# Patient Record
Sex: FEMALE | Race: WHITE
[De-identification: names, ages, dates, MRNs, and addresses within clinical notes are randomized per-mention and may not be internally consistent; named-entity substitution may affect disease eponyms.]

---

## 2019-05-23 ENCOUNTER — TELEPHONE (OUTPATIENT)
Dept: SCHEDULING | Age: 29
End: 2019-05-23

## 2019-05-24 ENCOUNTER — E-ADVICE (OUTPATIENT)
Dept: ENDOCRINOLOGY | Age: 29
End: 2019-05-24

## 2019-05-24 ENCOUNTER — LAB SERVICES (OUTPATIENT)
Dept: LAB | Age: 29
End: 2019-05-24

## 2019-05-24 ENCOUNTER — OFFICE VISIT (OUTPATIENT)
Dept: ENDOCRINOLOGY | Age: 29
End: 2019-05-24

## 2019-05-24 DIAGNOSIS — R53.82 CHRONIC FATIGUE: Primary | ICD-10-CM

## 2019-05-24 DIAGNOSIS — R53.82 CHRONIC FATIGUE: ICD-10-CM

## 2019-05-24 LAB
CORTIS SERPL-MCNC: 6.8 MCG/DL (ref 3.4–22.5)
FSH SERPL-ACNC: 5.8 MUNITS/ML
HBA1C MFR BLD: 5.5 % (ref 4.5–5.6)
LH SERPL-ACNC: 4 MUNITS/ML
PROLACTIN SERPL-MCNC: 2.9 NG/ML (ref 2.8–29.2)
T4 FREE SERPL-MCNC: 1.2 NG/DL (ref 0.8–1.5)
TSH SERPL-ACNC: 0.74 MCUNITS/ML (ref 0.35–5)

## 2019-05-24 PROCEDURE — 83001 ASSAY OF GONADOTROPIN (FSH): CPT | Performed by: INTERNAL MEDICINE

## 2019-05-24 PROCEDURE — 99202 OFFICE O/P NEW SF 15 MIN: CPT | Performed by: INTERNAL MEDICINE

## 2019-05-24 PROCEDURE — 83002 ASSAY OF GONADOTROPIN (LH): CPT | Performed by: INTERNAL MEDICINE

## 2019-05-24 PROCEDURE — 84439 ASSAY OF FREE THYROXINE: CPT | Performed by: INTERNAL MEDICINE

## 2019-05-24 PROCEDURE — 36415 COLL VENOUS BLD VENIPUNCTURE: CPT | Performed by: INTERNAL MEDICINE

## 2019-05-24 PROCEDURE — 82024 ASSAY OF ACTH: CPT | Performed by: INTERNAL MEDICINE

## 2019-05-24 PROCEDURE — 83036 HEMOGLOBIN GLYCOSYLATED A1C: CPT | Performed by: INTERNAL MEDICINE

## 2019-05-24 PROCEDURE — 82607 VITAMIN B-12: CPT | Performed by: INTERNAL MEDICINE

## 2019-05-24 PROCEDURE — 84443 ASSAY THYROID STIM HORMONE: CPT | Performed by: INTERNAL MEDICINE

## 2019-05-24 PROCEDURE — 84146 ASSAY OF PROLACTIN: CPT | Performed by: INTERNAL MEDICINE

## 2019-05-24 PROCEDURE — 82533 TOTAL CORTISOL: CPT | Performed by: INTERNAL MEDICINE

## 2019-05-24 RX ORDER — CELECOXIB 200 MG/1
200 CAPSULE ORAL 2 TIMES DAILY
COMMUNITY

## 2019-05-24 RX ORDER — ALPRAZOLAM 0.5 MG/1
0.25 TABLET ORAL DAILY PRN
COMMUNITY

## 2019-05-24 ASSESSMENT — PATIENT HEALTH QUESTIONNAIRE - PHQ9
1. LITTLE INTEREST OR PLEASURE IN DOING THINGS: NOT AT ALL
SUM OF ALL RESPONSES TO PHQ9 QUESTIONS 1 AND 2: 0
2. FEELING DOWN, DEPRESSED OR HOPELESS: NOT AT ALL
SUM OF ALL RESPONSES TO PHQ9 QUESTIONS 1 AND 2: 0

## 2019-05-24 ASSESSMENT — PAIN SCALES - GENERAL: PAINLEVEL: 1-2

## 2019-05-25 ENCOUNTER — E-ADVICE (OUTPATIENT)
Dept: ENDOCRINOLOGY | Age: 29
End: 2019-05-25

## 2019-05-25 DIAGNOSIS — R79.89 LOW SERUM CORTISOL LEVEL: Primary | ICD-10-CM

## 2019-05-26 ASSESSMENT — ENCOUNTER SYMPTOMS
DIZZINESS: 0
UNEXPECTED WEIGHT CHANGE: 0
FATIGUE: 1
HEMATOLOGIC/LYMPHATIC NEGATIVE: 1
HEADACHES: 1
PSYCHIATRIC NEGATIVE: 1
ALLERGIC/IMMUNOLOGIC NEGATIVE: 1
RESPIRATORY NEGATIVE: 1
ENDOCRINE NEGATIVE: 1
GASTROINTESTINAL NEGATIVE: 1
EYES NEGATIVE: 1

## 2019-05-28 ENCOUNTER — TELEPHONE (OUTPATIENT)
Dept: SCHEDULING | Age: 29
End: 2019-05-28

## 2019-05-28 ENCOUNTER — TELEPHONE (OUTPATIENT)
Dept: INTERNAL MEDICINE | Age: 29
End: 2019-05-28

## 2019-05-28 LAB — ACTH PLAS-MCNC: 5.2 PG/ML (ref 0–46)

## 2019-05-29 ENCOUNTER — E-ADVICE (OUTPATIENT)
Dept: ENDOCRINOLOGY | Age: 29
End: 2019-05-29

## 2019-05-29 LAB — VIT B12 SERPL-MCNC: 910 PG/ML (ref 211–911)

## 2019-05-31 ENCOUNTER — TELEPHONE (OUTPATIENT)
Dept: INTERNAL MEDICINE | Age: 29
End: 2019-05-31

## 2019-06-01 ENCOUNTER — TELEPHONE (OUTPATIENT)
Dept: INTERNAL MEDICINE | Age: 29
End: 2019-06-01

## 2019-06-04 ENCOUNTER — OFFICE VISIT (OUTPATIENT)
Dept: INTERNAL MEDICINE | Age: 29
End: 2019-06-04

## 2019-06-04 ENCOUNTER — LAB SERVICES (OUTPATIENT)
Dept: LAB | Age: 29
End: 2019-06-04

## 2019-06-04 DIAGNOSIS — R79.89 LOW SERUM CORTISOL LEVEL: ICD-10-CM

## 2019-06-04 LAB
CORTIS SERPL-MCNC: 24.6 MCG/DL (ref 3.4–22.5)
CORTIS SERPL-MCNC: 46.5 MCG/DL (ref 3.4–22.5)

## 2019-06-04 PROCEDURE — 36415 COLL VENOUS BLD VENIPUNCTURE: CPT | Performed by: INTERNAL MEDICINE

## 2019-06-04 PROCEDURE — 80400 ACTH STIMULATION PANEL: CPT | Performed by: INTERNAL MEDICINE

## 2019-06-04 PROCEDURE — 96372 THER/PROPH/DIAG INJ SC/IM: CPT | Performed by: INTERNAL MEDICINE

## 2019-06-05 ENCOUNTER — E-ADVICE (OUTPATIENT)
Dept: ENDOCRINOLOGY | Age: 29
End: 2019-06-05

## 2019-06-10 ENCOUNTER — TELEPHONE (OUTPATIENT)
Dept: SCHEDULING | Age: 29
End: 2019-06-10

## 2019-06-19 ENCOUNTER — E-ADVICE (OUTPATIENT)
Dept: ENDOCRINOLOGY | Age: 29
End: 2019-06-19

## 2019-06-28 DIAGNOSIS — R53.82 CHRONIC FATIGUE: ICD-10-CM

## 2021-05-25 VITALS
SYSTOLIC BLOOD PRESSURE: 118 MMHG | RESPIRATION RATE: 16 BRPM | TEMPERATURE: 98.4 F | HEIGHT: 67 IN | HEART RATE: 72 BPM | DIASTOLIC BLOOD PRESSURE: 64 MMHG | OXYGEN SATURATION: 100 % | BODY MASS INDEX: 20.25 KG/M2 | WEIGHT: 129 LBS

## 2021-07-25 PROBLEM — R51.9 CHRONIC DAILY HEADACHE: Status: ACTIVE | Noted: 2021-07-25

## 2021-07-25 PROBLEM — M79.7 FIBROMYALGIA: Status: ACTIVE | Noted: 2021-07-25

## 2021-07-25 PROBLEM — R00.0 TACHYCARDIA: Status: ACTIVE | Noted: 2021-07-25

## 2021-07-25 PROBLEM — R79.89 ABNORMAL TSH: Status: ACTIVE | Noted: 2021-07-25

## 2021-07-25 PROBLEM — Z28.21 COVID-19 VACCINE SERIES DECLINED: Status: ACTIVE | Noted: 2021-07-25

## 2021-07-25 PROBLEM — Z28.310 COVID-19 VACCINE SERIES DECLINED: Status: ACTIVE | Noted: 2021-07-25

## 2021-07-25 PROBLEM — L70.8 OTHER ACNE: Status: ACTIVE | Noted: 2021-07-25

## 2021-07-25 PROBLEM — F41.1 GAD (GENERALIZED ANXIETY DISORDER): Status: ACTIVE | Noted: 2021-07-25

## 2021-07-25 PROBLEM — Z86.69 HISTORY OF EPILEPSY: Status: ACTIVE | Noted: 2021-07-25

## 2023-06-19 LAB
AMB EXT TREPONEMAL ANTIBODIES: NEGATIVE
ANTIBODY SCREEN OB: NEGATIVE
HEPATITIS B SURFACE ANTIGEN OB: NEGATIVE
HIV RESULT OB: NEGATIVE
RH FACTOR OB: POSITIVE

## 2023-07-17 ENCOUNTER — TELEPHONE (OUTPATIENT)
Dept: PERINATAL CARE | Facility: HOSPITAL | Age: 33
End: 2023-07-17

## 2023-07-17 NOTE — TELEPHONE ENCOUNTER
Returned pt call RE scheduling. No answer  Left Voice mail to call back with [facility] number  [phone]      [facility] fax # [phone] to have order faxed in prior to scheduling with [facility].

## 2023-10-16 ENCOUNTER — OFFICE VISIT (OUTPATIENT)
Dept: PERINATAL CARE | Facility: HOSPITAL | Age: 33
End: 2023-10-16
Attending: PEDIATRICS
Payer: COMMERCIAL

## 2023-10-16 DIAGNOSIS — R00.0 TACHYCARDIA: Primary | ICD-10-CM

## 2023-10-16 DIAGNOSIS — R00.0 TACHYCARDIA: ICD-10-CM

## 2023-10-16 PROCEDURE — 76825 ECHO EXAM OF FETAL HEART: CPT | Performed by: PEDIATRICS

## 2023-10-16 PROCEDURE — 93325 DOPPLER ECHO COLOR FLOW MAPG: CPT

## 2023-10-16 PROCEDURE — 76827 ECHO EXAM OF FETAL HEART: CPT

## 2023-10-16 NOTE — PROGRESS NOTES
CARDIOLOGY CONSULTATION AND FETAL ECHOCARDIOGRAM :    Dear Dr. Melissa Garcia,     Thank you for requesting fetal echocardiogram and  cardiology consultation on [facility]. As you are aware she is a 34year old female with a Talavera pregnancy at 25w2d.  cardiology consultation was requested secondary to  suspected VSD and medication exposure. Her prenatal records and labs were reviewed. OB History:         T2    L2    SAB1  IAB0  Ectopic0  Multiple0  Live Births2       Comment: PP hemorrhage     Name of Baby 1: Not recorded    Date: Not recorded     GA: Not recorded     Delivery: Not recorded    Ardena Arrow: Not recorded     Analia Eduardo: Not recorded    Living: Not recorded     Name of Baby 2: Not recorded    Date: 09/24/15         GA: 39w0d            Delivery: Caesarean Section    Apgar1: Not recorded     Apgar5: Not recorded    Living: Living     Name of Baby 3: Not recorded    Date: 18         GA: 39w0d            Delivery: Normal spontaneous vaginal delivery    Apgar1: Not recorded     Apgar5: Not recorded    Living: Living     Name of Baby 4: Not recorded    Date: Not recorded     GA: Not recorded     Delivery: Not recorded    Apgar1: Not recorded     Analia Wardiel: Not recorded    Living: Not recorded        Allergies:  No Known Allergies  Current Meds:        Current Outpatient Medications  Medication Sig Dispense Refill   Prenatal 27-0.8 MG Oral Tab Take 1 tablet by mouth daily. amitriptyline 10 MG Oral Tab Take 2 tablets (20 mg total) by mouth nightly. (Patient not taking: Reported on 2023)   0   Lactic Ac-Citric Ac-Pot Bitart (PHEXXI) 1.8-1-0.4 % Vaginal Gel Place 5 g vaginally as needed. (Patient not taking: Reported on 2023) 12 each 3   spironolactone 100 MG Oral Tab Take 100 mg by mouth nightly. (Patient not taking: Reported on 2023)       Magnesium 200 MG Oral Tab Take by mouth.  (Patient not taking: Reported on 2023)       clonazePAM 0.5 MG Oral Tab Take 0.25 mg by mouth. (Patient not taking: Reported on 2023)       Multiple Vitamin (MULTIVITAMINS) Oral Tab Take 1 Tab by mouth daily. (Patient not taking: Reported on 2023)           HISTORY:      Past Medical History:  Diagnosis Date   Abnormal TSH 2021   Chronic daily headache 2021   COVID-19 vaccine series declined 2021   Fibromyalgia     LILIAM (generalized anxiety disorder) 2021   History of epilepsy 2021   Other acne 2021   SEIZURE DISORDER     Tachycardia 2021         Past Surgical History:  Procedure Laterality Date    DELIVERY ONLY             Family History  Problem Relation Age of Onset   Diabetes Maternal Grandmother     Hypertension Father     Lipids Father     Hypertension Paternal Grandmother     Lipids Mother     Diabetes Other  No family history of congenital heart disease. Social History    Tobacco Use      Smoking status: Never      Smokeless tobacco: Never    Vaping Use      Vaping Use: Never used    Alcohol use: Not Currently    Drug use: Never    FETAL ECHOCARDIOGRAM :     Situs: situs solitus (normal). Cardiac position: levocardia (normal). Cardiac axis: normal. Cardiac size: normal (approx. 1/3 of thoracic area). Cardiac Rhythm: regular (normal). Cardiac function: good contractility (normal). 4-chamber view: normal. LVOT view: normal. RVOT view: normal. 3-vessel view: normal. 3-vessel-trachea view: normal. Long axis view: normal. Aortic arch view: normal. Ductal arch view: normal. Bicaval view: normal    2D Echo (Qualitatively):   AV connections: Normal connections. VA connections: Normal connections. IVC: Normal entrance into the right atrium. SVC: Normal entrance into the right atrium. Pulmonary veins: Two pulmonary veins entry into the left atrium. Right atrium: Normal in size. Left atrium: Normal in size. Atrial septum: foramen ovale in the central third/half, flap valve in LA. Foramen ovale: normal flow: right to left. Right ventricle: Normal sized RV with normal systolic functions. Left ventricle: Normal sized LV with normal systolic functions. Ventricular septum: Small apical muscular VSD with bidirectional shunting. Tricuspid valve: normal size and morphology. Mitral valve: normal size and morphology. Pulmonary valve: normal size . Aortic valve: normal size. Cross-over gr. arteries: anterior great artery (confirmed to be the pulmonary artery by its branching) which crosses the course of the proximal aorta, indicative of normal relationship of the great arteries. Main PA: the main pulmonary artery can be seen bifurcating into the ductus arteriosus and the right pulmonary artery. Pulmonary arteries: Visualized. Ascending aorta: normal size and morphology. Ductal arch: Left ductus arteriosus. Aortic arch: Single left sided aortic arch with no evidence for coarctation. Ductus venosus: normal. Umbilical vein: normal. Umbilical arteries: normal  Echogenic focus: no. Linear insertion of AV valves: no. Pericardial effusion: no    Color Doppler (Qualitatively):   IVC inflow into RA: normal. SVC inflow into RA: normal. Pulm. veins inflow into LA: normal. Flow through foramen ovale: right-left shunt (normal). Tricuspid valve flow: normal. Mitral valve flow: normal. Ventricular septum: normal. RVOT / Pulmonary valve flow: normal. LVOT / Aortic valve flow: normal. Flow in pulmonary arteries: normal. Flow in ductus arteriosus: normal. Flow in aortic arch: normal. Flow in ductus venosus: normal. Flow in the umbilical vein: normal. Flow in the umbilical arteries: normal    Small apical muscular VSD with bidirectional shunting. Otherwise unremarkable fetal echocardiogram. No major structural or functional abnormality was noted. DISCUSSION :    Exposure to benzodiazepine : It is category D. Therapeutic doses in pregnancy have not been shown to significantly increase the risk of teratogenecity.  There is lack of sufficient data to indicate that there is no risk. Transient  withdrawal symptoms are seen in pregnant mothers that exposed to the medication during pregnancy. Its use during pregnancy may be considered if the potentail benefits seem to outweight potential risks. Fetal echocardiogram findings are described above. There was a small apical muscular VSD with bidirectional shunting. Otherwise unremarkable fetal echocardiogram. No major structural or functional abnormality was noted. I had a lengthy discussion regarding my findings with the parents. I drew a picture of the fetal heart and went over my findings with them. Isolated muscular VSDs carry a very favorable outcome with high likely chance of spontaneous closures. If there are multiple muscular VSDs \" so called Swiss cheese muscular VSDs\" they may push the infant for CHF and may require surgery. At this time I see one small isolated muscular VSD. They had many questions. I answered their questions. They seemed to have a good understanding the issues we talked. Small VSDs,minor valve problems,coronary artery anomalies,PAPVR and coarctation of the aorta cannot be excluded by fetal echocardiograms. IMPRESSION :    IUP at 25w 2d  No fetal structural abnormalities seen other than possible VSD  Medication exposure (benzodiazepine)  H/o postpartum hemorrhage  Fetal echocardiogram : Small apical muscular VSD with bidirectional shunting. Otherwise unremarkable fetal echocardiogram. No major structural or functional abnormality was noted. RECOMMENDATIONS :    Routine Ob and MFM care  [facility] at [facility]   echocardiogram prior to discharge      Thank you for allowing me to participate in the care of your patient. Please do not hesitate to call with any questions or concerns.      Francisco Cortes MD  Fetal/pediatric Cardiology    Total time spent  60 minutes this calendar day which includes preparing to see the patient including chart review, obtaining and/or reviewing additional medical history,  documenting clinical information in the electronic medical record, independently interpreting results, counseling the patient, communicating results to the patient/family/caregiver and coordinating care. Note to patient and family:  The [id number] makes medical notes available to patients in the interest of transparency. However, please be advised that this is a medical document. It is intended as a peer to peer communication. It is written in medical language and may contain abbreviations or verbiage that are technical and unfamiliar. It may appear blunt or direct. Medical documents are intended to carry relevant information, facts as evident, and the clinical opinion of the practitioner.

## 2023-11-03 LAB
AMB EXT STREP B CULTURE: NEGATIVE
AMB EXT TREPONEMAL ANTIBODIES: NEGATIVE
HIV RESULT OB: NEGATIVE

## 2023-12-07 ENCOUNTER — HOSPITAL ENCOUNTER (OUTPATIENT)
Dept: PERINATAL CARE | Facility: HOSPITAL | Age: 33
Discharge: HOME | End: 2023-12-07
Attending: OBSTETRICS & GYNECOLOGY
Payer: COMMERCIAL

## 2023-12-07 VITALS
DIASTOLIC BLOOD PRESSURE: 79 MMHG | SYSTOLIC BLOOD PRESSURE: 133 MMHG | HEART RATE: 90 BPM | WEIGHT: 168 LBS | BODY MASS INDEX: 26 KG/M2

## 2023-12-07 DIAGNOSIS — R56.9 SEIZURES (HCC): ICD-10-CM

## 2023-12-07 DIAGNOSIS — Z36.83 ENCOUNTER FOR FETAL SCREENING FOR CONGENITAL CARDIAC ABNORMALITIES: ICD-10-CM

## 2023-12-07 DIAGNOSIS — F41.1 GAD (GENERALIZED ANXIETY DISORDER): ICD-10-CM

## 2023-12-07 DIAGNOSIS — Z36.83 ENCOUNTER FOR FETAL SCREENING FOR CONGENITAL CARDIAC ABNORMALITIES: Primary | ICD-10-CM

## 2023-12-07 PROCEDURE — 76819 FETAL BIOPHYS PROFIL W/O NST: CPT

## 2023-12-07 PROCEDURE — 76816 OB US FOLLOW-UP PER FETUS: CPT | Performed by: OBSTETRICS & GYNECOLOGY

## 2024-01-21 ENCOUNTER — ANESTHESIA EVENT (OUTPATIENT)
Dept: OBGYN UNIT | Facility: HOSPITAL | Age: 34
End: 2024-01-21
Payer: COMMERCIAL

## 2024-01-21 ENCOUNTER — HOSPITAL ENCOUNTER (INPATIENT)
Facility: HOSPITAL | Age: 34
LOS: 1 days | Discharge: HOME | End: 2024-01-22
Attending: OBSTETRICS & GYNECOLOGY | Admitting: OBSTETRICS & GYNECOLOGY
Payer: COMMERCIAL

## 2024-01-21 ENCOUNTER — ANESTHESIA (OUTPATIENT)
Dept: OBGYN UNIT | Facility: HOSPITAL | Age: 34
End: 2024-01-21
Payer: COMMERCIAL

## 2024-01-21 PROBLEM — Z34.90 PREGNANCY (HCC): Status: ACTIVE | Noted: 2024-01-21

## 2024-01-21 PROBLEM — O34.219 VAGINAL BIRTH AFTER CESAREAN: Status: ACTIVE | Noted: 2024-01-21

## 2024-01-21 PROBLEM — O34.219 VAGINAL BIRTH AFTER CESAREAN (HCC): Status: ACTIVE | Noted: 2024-01-21

## 2024-01-21 PROBLEM — Z34.90 PREGNANCY: Status: ACTIVE | Noted: 2024-01-21

## 2024-01-21 LAB
ANTIBODY SCREEN: NEGATIVE
BASOPHILS # BLD AUTO: 0.02 X10(3) UL (ref 0–0.2)
BASOPHILS NFR BLD AUTO: 0.2 %
DEPRECATED RDW RBC AUTO: 44.5 FL (ref 35.1–46.3)
EOSINOPHIL # BLD AUTO: 0.04 X10(3) UL (ref 0–0.7)
EOSINOPHIL NFR BLD AUTO: 0.4 %
ERYTHROCYTE [DISTWIDTH] IN BLOOD BY AUTOMATED COUNT: 13.6 % (ref 11–15)
HCT VFR BLD AUTO: 39.3 %
HGB BLD-MCNC: 13.1 G/DL
IMM GRANULOCYTES # BLD AUTO: 0.08 X10(3) UL (ref 0–1)
IMM GRANULOCYTES NFR BLD: 0.7 %
LYMPHOCYTES # BLD AUTO: 2.08 X10(3) UL (ref 1–4)
LYMPHOCYTES NFR BLD AUTO: 18.7 %
MCH RBC QN AUTO: 29.7 PG (ref 26–34)
MCHC RBC AUTO-ENTMCNC: 33.3 G/DL (ref 31–37)
MCV RBC AUTO: 89.1 FL
MONOCYTES # BLD AUTO: 0.57 X10(3) UL (ref 0.1–1)
MONOCYTES NFR BLD AUTO: 5.1 %
NEUTROPHILS # BLD AUTO: 8.31 X10 (3) UL (ref 1.5–7.7)
NEUTROPHILS # BLD AUTO: 8.31 X10(3) UL (ref 1.5–7.7)
NEUTROPHILS NFR BLD AUTO: 74.9 %
PLATELET # BLD AUTO: 224 10(3)UL (ref 150–450)
RBC # BLD AUTO: 4.41 X10(6)UL
RH BLOOD TYPE: POSITIVE
RH BLOOD TYPE: POSITIVE
WBC # BLD AUTO: 11.1 X10(3) UL (ref 4–11)

## 2024-01-21 PROCEDURE — 99214 OFFICE O/P EST MOD 30 MIN: CPT

## 2024-01-21 PROCEDURE — 86900 BLOOD TYPING SEROLOGIC ABO: CPT | Performed by: OBSTETRICS & GYNECOLOGY

## 2024-01-21 PROCEDURE — 85025 COMPLETE CBC W/AUTO DIFF WBC: CPT | Performed by: OBSTETRICS & GYNECOLOGY

## 2024-01-21 PROCEDURE — 86901 BLOOD TYPING SEROLOGIC RH(D): CPT | Performed by: OBSTETRICS & GYNECOLOGY

## 2024-01-21 PROCEDURE — 86850 RBC ANTIBODY SCREEN: CPT | Performed by: OBSTETRICS & GYNECOLOGY

## 2024-01-21 RX ORDER — BISACODYL 10 MG
10 SUPPOSITORY, RECTAL RECTAL ONCE AS NEEDED
Status: DISCONTINUED | OUTPATIENT
Start: 2024-01-21 | End: 2024-01-22

## 2024-01-21 RX ORDER — ONDANSETRON 2 MG/ML
4 INJECTION INTRAMUSCULAR; INTRAVENOUS EVERY 6 HOURS PRN
Status: DISCONTINUED | OUTPATIENT
Start: 2024-01-21 | End: 2024-01-22

## 2024-01-21 RX ORDER — CHOLECALCIFEROL (VITAMIN D3) 25 MCG
1 TABLET,CHEWABLE ORAL DAILY
Status: DISCONTINUED | OUTPATIENT
Start: 2024-01-21 | End: 2024-01-22

## 2024-01-21 RX ORDER — ACETAMINOPHEN 500 MG
1000 TABLET ORAL EVERY 6 HOURS PRN
Status: DISCONTINUED | OUTPATIENT
Start: 2024-01-21 | End: 2024-01-22

## 2024-01-21 RX ORDER — BENZOCAINE/MENTHOL 6 MG-10 MG
1 LOZENGE MUCOUS MEMBRANE EVERY 6 HOURS PRN
Status: DISCONTINUED | OUTPATIENT
Start: 2024-01-21 | End: 2024-01-22

## 2024-01-21 RX ORDER — MISOPROSTOL 200 UG/1
TABLET ORAL
Status: DISCONTINUED
Start: 2024-01-21 | End: 2024-01-21 | Stop reason: WASHOUT

## 2024-01-21 RX ORDER — SODIUM CHLORIDE, SODIUM LACTATE, POTASSIUM CHLORIDE, CALCIUM CHLORIDE 600; 310; 30; 20 MG/100ML; MG/100ML; MG/100ML; MG/100ML
INJECTION, SOLUTION INTRAVENOUS AS NEEDED
Status: DISCONTINUED | OUTPATIENT
Start: 2024-01-21 | End: 2024-01-21 | Stop reason: HOSPADM

## 2024-01-21 RX ORDER — CHOLECALCIFEROL (VITAMIN D3) 25 MCG
1 TABLET,CHEWABLE ORAL DAILY
COMMUNITY

## 2024-01-21 RX ORDER — SIMETHICONE 80 MG
80 TABLET,CHEWABLE ORAL 3 TIMES DAILY PRN
Status: DISCONTINUED | OUTPATIENT
Start: 2024-01-21 | End: 2024-01-22

## 2024-01-21 RX ORDER — DEXTROSE, SODIUM CHLORIDE, SODIUM LACTATE, POTASSIUM CHLORIDE, AND CALCIUM CHLORIDE 5; .6; .31; .03; .02 G/100ML; G/100ML; G/100ML; G/100ML; G/100ML
INJECTION, SOLUTION INTRAVENOUS CONTINUOUS
Status: DISCONTINUED | OUTPATIENT
Start: 2024-01-21 | End: 2024-01-21 | Stop reason: HOSPADM

## 2024-01-21 RX ORDER — ONDANSETRON 2 MG/ML
4 INJECTION INTRAMUSCULAR; INTRAVENOUS EVERY 6 HOURS PRN
Status: DISCONTINUED | OUTPATIENT
Start: 2024-01-21 | End: 2024-01-21 | Stop reason: HOSPADM

## 2024-01-21 RX ORDER — IBUPROFEN 600 MG/1
600 TABLET ORAL EVERY 6 HOURS
Status: DISCONTINUED | OUTPATIENT
Start: 2024-01-21 | End: 2024-01-22

## 2024-01-21 RX ORDER — BUPIVACAINE HCL/0.9 % NACL/PF 0.25 %
5 PLASTIC BAG, INJECTION (ML) EPIDURAL AS NEEDED
Status: DISCONTINUED | OUTPATIENT
Start: 2024-01-21 | End: 2024-01-21

## 2024-01-21 RX ORDER — DOCUSATE SODIUM 100 MG/1
100 CAPSULE, LIQUID FILLED ORAL 2 TIMES DAILY
Status: DISCONTINUED | OUTPATIENT
Start: 2024-01-21 | End: 2024-01-22

## 2024-01-21 RX ORDER — ACETAMINOPHEN 500 MG
500 TABLET ORAL EVERY 6 HOURS PRN
Status: DISCONTINUED | OUTPATIENT
Start: 2024-01-21 | End: 2024-01-21 | Stop reason: HOSPADM

## 2024-01-21 RX ORDER — ACETAMINOPHEN 500 MG
1000 TABLET ORAL EVERY 6 HOURS PRN
Status: DISCONTINUED | OUTPATIENT
Start: 2024-01-21 | End: 2024-01-21 | Stop reason: HOSPADM

## 2024-01-21 RX ORDER — LIDOCAINE HYDROCHLORIDE 10 MG/ML
INJECTION, SOLUTION INFILTRATION; PERINEURAL
Status: COMPLETED | OUTPATIENT
Start: 2024-01-21 | End: 2024-01-21

## 2024-01-21 RX ORDER — CITRIC ACID/SODIUM CITRATE 334-500MG
30 SOLUTION, ORAL ORAL AS NEEDED
Status: DISCONTINUED | OUTPATIENT
Start: 2024-01-21 | End: 2024-01-21 | Stop reason: HOSPADM

## 2024-01-21 RX ORDER — TRANEXAMIC ACID 10 MG/ML
INJECTION, SOLUTION INTRAVENOUS
Status: COMPLETED
Start: 2024-01-21 | End: 2024-01-21

## 2024-01-21 RX ORDER — NALBUPHINE HYDROCHLORIDE 10 MG/ML
2.5 INJECTION, SOLUTION INTRAMUSCULAR; INTRAVENOUS; SUBCUTANEOUS
Status: DISCONTINUED | OUTPATIENT
Start: 2024-01-21 | End: 2024-01-21

## 2024-01-21 RX ORDER — ACETAMINOPHEN 500 MG
500 TABLET ORAL EVERY 6 HOURS PRN
Status: DISCONTINUED | OUTPATIENT
Start: 2024-01-21 | End: 2024-01-22

## 2024-01-21 RX ORDER — LIDOCAINE HYDROCHLORIDE 10 MG/ML
30 INJECTION, SOLUTION EPIDURAL; INFILTRATION; INTRACAUDAL; PERINEURAL ONCE
Status: DISCONTINUED | OUTPATIENT
Start: 2024-01-21 | End: 2024-01-21 | Stop reason: HOSPADM

## 2024-01-21 RX ORDER — AMMONIA INHALANTS 0.04 G/.3ML
0.3 INHALANT RESPIRATORY (INHALATION) AS NEEDED
Status: DISCONTINUED | OUTPATIENT
Start: 2024-01-21 | End: 2024-01-22

## 2024-01-21 RX ORDER — IBUPROFEN 600 MG/1
600 TABLET ORAL ONCE AS NEEDED
Status: DISCONTINUED | OUTPATIENT
Start: 2024-01-21 | End: 2024-01-21 | Stop reason: HOSPADM

## 2024-01-21 RX ORDER — LIDOCAINE HYDROCHLORIDE AND EPINEPHRINE 15; 5 MG/ML; UG/ML
INJECTION, SOLUTION EPIDURAL
Status: COMPLETED | OUTPATIENT
Start: 2024-01-21 | End: 2024-01-21

## 2024-01-21 RX ORDER — CARBOPROST TROMETHAMINE 250 UG/ML
INJECTION, SOLUTION INTRAMUSCULAR
Status: DISCONTINUED
Start: 2024-01-21 | End: 2024-01-21 | Stop reason: WASHOUT

## 2024-01-21 RX ORDER — METHYLERGONOVINE MALEATE 0.2 MG/ML
INJECTION INTRAVENOUS
Status: DISCONTINUED
Start: 2024-01-21 | End: 2024-01-21 | Stop reason: WASHOUT

## 2024-01-21 RX ORDER — BUPIVACAINE HYDROCHLORIDE 2.5 MG/ML
20 INJECTION, SOLUTION EPIDURAL; INFILTRATION; INTRACAUDAL ONCE
Status: DISCONTINUED | OUTPATIENT
Start: 2024-01-21 | End: 2024-01-21 | Stop reason: HOSPADM

## 2024-01-21 RX ORDER — TERBUTALINE SULFATE 1 MG/ML
0.25 INJECTION, SOLUTION SUBCUTANEOUS AS NEEDED
Status: DISCONTINUED | OUTPATIENT
Start: 2024-01-21 | End: 2024-01-21 | Stop reason: HOSPADM

## 2024-01-21 RX ORDER — MELATONIN
325
COMMUNITY
End: 2024-01-22

## 2024-01-21 RX ADMIN — LIDOCAINE HYDROCHLORIDE 5 ML: 10 INJECTION, SOLUTION INFILTRATION; PERINEURAL at 11:20:00

## 2024-01-21 RX ADMIN — LIDOCAINE HYDROCHLORIDE AND EPINEPHRINE 2 ML: 15; 5 INJECTION, SOLUTION EPIDURAL at 11:33:00

## 2024-01-21 RX ADMIN — LIDOCAINE HYDROCHLORIDE AND EPINEPHRINE 3 ML: 15; 5 INJECTION, SOLUTION EPIDURAL at 11:31:00

## 2024-01-21 NOTE — ANESTHESIA PROCEDURE NOTES
Labor Analgesia    Date/Time: 1/21/2024 11:20 AM    Performed by: Colin Durant MD  Authorized by: Colin Durant MD      General Information and Staff    Start Time:  1/21/2024 11:20 AM  End Time:  1/21/2024 11:31 AM  Anesthesiologist:  Colin Durant MD  Performed by:  Anesthesiologist  Patient Location:  OB  Reason for Block: labor epidural    Preanesthetic Checklist: patient identified, IV checked, site marked, risks and benefits discussed, monitors and equipment checked, pre-op evaluation, timeout performed, anesthesia consent and sterile technique used      Procedure Details    Patient Position:  Sitting  Prep: ChloraPrep    Monitoring:  Heart rate  Approach:  Midline    Epidural Needle    Injection Technique:  MELIA air  Needle Type:  Tuohy  Needle Gauge:  18 G  Needle Length:  3.5 in  Needle Insertion Depth:  5  Location:  L3-4    Spinal Needle    Needle Type:  Pencil-tip  Needle Gauge:  27 G    Catheter    Catheter Type:  Multi-orifice  Catheter Size:  20 G  Catheter at Skin Depth:  10  Test Dose:  Negative    Assessment      Additional Comments     Patient with significant scoliosis. MELIA attained at 5cm at L3-4 level, confirmed with dural puncture with 27g pencil tip returning clear CSF. Epidural catheter threaded without difficulty and secured 10cm at skin.

## 2024-01-21 NOTE — ANESTHESIA PREPROCEDURE EVALUATION
Anesthesia PreOp Note    HPI:     Rachel Hernandez is a 33 year old female who presents for preoperative consultation requested by: * No surgeons listed *    Date of Surgery: 2024    * No procedures listed *  Indication: * No pre-op diagnosis entered *    Relevant Problems   No relevant active problems       NPO:                         History Review:  Patient Active Problem List    Diagnosis Date Noted    Pregnancy 2024    Fibromyalgia 2021    Chronic daily headache 2021    Other acne 2021    LILIAM (generalized anxiety disorder) 2021    History of epilepsy 2021    Abnormal TSH 2021    COVID-19 vaccine series declined 2021    Tachycardia 2021    Seizures (HCC) 04/10/2012       Past Medical History:   Diagnosis Date    Abnormal TSH 2021    Chronic daily headache 2021    COVID-19 vaccine series declined 2021    Fibromyalgia     LILIAM (generalized anxiety disorder) 2021    History of epilepsy 2021    Other acne 2021    SEIZURE DISORDER     Tachycardia 2021       Past Surgical History:   Procedure Laterality Date     DELIVERY ONLY         Medications Prior to Admission   Medication Sig Dispense Refill Last Dose    prenatal vitamin with DHA 27-0.8-228 MG Oral Cap Take 1 capsule by mouth daily.       ferrous sulfate 325 (65 FE) MG Oral Tab EC Take 1 tablet (325 mg total) by mouth daily with breakfast.       Lactic Ac-Citric Ac-Pot Bitart (PHEXXI) 1.8-1-0.4 % Vaginal Gel Place 1 each vaginally as needed. With intercourse. 30 each 2     Segesterone-Ethinyl Estradiol (ANNOVERA) 0.15-0.013 MG/24HR Vaginal Ring Place 1 ring vaginally As Directed. 1 each 0     SAVELLA 12.5 MG Oral Tab Take 12.5 mg by mouth daily.       Naltrexone HCl 50 MG Oral Tab Take 1 tablet (50 mg total) by mouth daily.  0     spironolactone 100 MG Oral Tab Take 100 mg by mouth nightly.       Magnesium 200 MG Oral Tab Take by mouth.        Segesterone-Ethinyl Estradiol (ANNOVERA) 0.15-0.013 MG/24HR Vaginal Ring Place vaginally.       clonazePAM 0.5 MG Oral Tab Take 0.25 mg by mouth.       OXcarbazepine 300 MG Oral Tab TAKE 1 TABLET BY MOUTH TWICE DAILY 60 Tab 0     Propranolol HCl 10 MG Oral Tab TAKE 1 TABLET BY MOUTH TWICE DAILY 60 Tab 0     TRINESSA, 28, 0.18/0.215/0.25 MG-35 MCG Oral Tab TAKE 1 TABLET BY MOUTH DAILY 84 Tab 0     OXcarbazepine (TRILEPTAL) 150 MG Oral Tab Take 150 mg by mouth 2 (two) times daily.       Multiple Vitamin (MULTIVITAMINS) Oral Tab Take 1 Tab by mouth daily.        Current Facility-Administered Medications Ordered in Epic   Medication Dose Route Frequency Provider Last Rate Last Admin    dextrose in lactated ringers 5% infusion   Intravenous Continuous Surya Pruitt MD        lactated ringers infusion   Intravenous PRN Surya Pruitt MD        lactated ringers IV bolus 500 mL  500 mL Intravenous PRN Surya Pruitt MD        acetaminophen (Tylenol Extra Strength) tab 500 mg  500 mg Oral Q6H PRN Surya Pruitt MD        acetaminophen (Tylenol Extra Strength) tab 1,000 mg  1,000 mg Oral Q6H PRN Surya Pruitt MD        ibuprofen (Motrin) tab 600 mg  600 mg Oral Once PRN Surya Pruitt MD        ondansetron (Zofran) 4 MG/2ML injection 4 mg  4 mg Intravenous Q6H PRN Surya Pruitt MD        oxyTOCIN in sodium chloride 0.9% (Pitocin) 30 Units/500mL infusion premix  62.5-900 hany-units/min Intravenous Continuous Surya Pruitt MD        terbutaline (Brethine) 1 MG/ML injection 0.25 mg  0.25 mg Subcutaneous PRN Surya Pruitt MD        sodium citrate-citric acid (Bicitra) 500-334 MG/5ML oral solution 30 mL  30 mL Oral PRN Surya Pruitt MD        lidocaine PF (Xylocaine-MPF) 1% injection  30 mL Intradermal Once Surya Pruitt MD        fentaNYL (Sublimaze) 50 mcg/mL injection 100 mcg  100 mcg Intravenous Once Surya Pruitt MD        fentaNYL (Sublimaze) 50 mcg/mL injection 50 mcg  50 mcg Intravenous Q30  Min PRN Surya Pruitt MD        lactated ringers IV bolus 1,000 mL  1,000 mL Intravenous Once Bhargav, Cody, DO        fentaNYL-bupivacaine 2 mcg/mL-0.125% in sodium chloride 0.9% 100 mL EPIDURAL infusion premix   Epidural Continuous Durant, Cody, DO        fentaNYL (Sublimaze) 50 mcg/mL injection 100 mcg  100 mcg Epidural Once Durant, Cody, DO        bupivacaine PF (Marcaine) 0.25% injection  20 mL Epidural Once Durant, Cody, DO        EPHEDrine (PF) 25 MG/5 ML injection 5 mg  5 mg Intravenous PRN Durant, Cody, DO        nalbuphine (Nubain) 10 mg/mL injection 2.5 mg  2.5 mg Intravenous Q15 Min PRN Bhargav, Cody, DO        tranexamic acid in sodium chloride 0.7% (Cyklokapron) 1000 mg/100mL infusion premix              No current Epic-ordered outpatient medications on file.       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  Lab Results   Component Value Date    WBC 11.1 (H) 01/21/2024    RBC 4.41 01/21/2024    HGB 13.1 01/21/2024    HCT 39.3 01/21/2024    MCV 89.1 01/21/2024    MCH 29.7 01/21/2024    MCHC 33.3 01/21/2024    RDW 13.6 01/21/2024    .0 01/21/2024             Vital Signs:  Body mass index is 27.88 kg/m².   height is 1.702 m (5' 7\") and weight is 80.7 kg (178 lb). Her oral temperature is 98.4 °F  (36.9 °C). Her blood pressure is 129/87. Her respiration is 16.   Vitals:    01/21/24 0447 01/21/24 0455   BP:  129/87   Resp:  16   Temp:  98.4 °F (36.9 °C)   TempSrc:  Oral   Weight: 80.7 kg (178 lb)    Height: 1.702 m (5' 7\")         Anesthesia Evaluation     Patient summary reviewed and Nursing notes reviewed    No history of anesthetic complications   Airway   Mallampati: II  TM distance: <3 FB  Neck ROM: full  Dental - Dentition appears grossly intact     Pulmonary - negative ROS and normal exam   Cardiovascular - negative ROS and normal exam  Exercise tolerance: good    Neuro/Psych    (+)  neuromuscular disease (hx epilepsy, last seizure >13 years ago, has been off seizure meds (formerly oxcarbazepine) for years),        GI/Hepatic/Renal - negative ROS     Endo/Other - negative ROS   Abdominal      Other findings: Scoliosis extending down to lumbar spine, convexity curving left of midline.            Anesthesia Plan:   ASA:  2  Emergent    Plan:   Epidural  Post-op Pain Management: Continuous epidural  Plan Comments: Neuraxial anesthesia was explained in detail to the patient, addressing the technique, intended outcome and known adverse events namely spinal or epidural bleeding, infection, post dural puncture headaches, complete spinal block with resulting cardiovascular and respiratory failure, block failure and or need for multiple attempts or switching to general anesthesia. All these risks are increased with the patient's scoliosis, patient verbalizes understanding.  Informed Consent Plan and Risks Discussed With:  Patient and spouse      I have informed Rachel Benítezch and/or legal guardian or family member of the nature of the anesthetic plan, benefits, risks including possible dental damage if relevant, major complications, and any alternative forms of anesthetic management.   All of the patient's questions were answered to the best of my ability. The patient desires the anesthetic management  as planned.  Colin Durant MD  1/21/2024 11:15 AM  Present on Admission:  **None**

## 2024-01-21 NOTE — L&D DELIVERY NOTE
David, Girl [[id number]]      Labor Events     labor?: No  Antibiotics received during labor?: No  Rupture date/time: 2024 0915     Rupture type: AROM  Fluid color: Clear  Labor type: Spontaneous Onset of Labor  Augmentation: AROM  Indications for augmentation: Ineffective Contraction Pattern  Intrapartum & labor complications: None       Labor Event Times    Labor onset date/time: 2024 0915       [place] Presentation    No data filed       Operative Delivery    No data filed       Shoulder Dystocia    No data filed       Anesthesia    Method: Epidural              [place] Delivery      Head delivery date/time: 2024 15:10:13   Delivery date/time:  24 15:10:45   Delivery type: Normal spontaneous vaginal delivery    Details:     Delivery location: delivery room       Delivery Providers    Delivering Clinician: Surya Pruitt MD   Delivery personnel:  Provider Role    Baby Nurse   Ca Deng, RN Delivery Nurse             Cord    No data filed        Measurements    No data filed       Placenta    No data filed       Apgars    No data filed       Skin to Skin    No data filed       Vaginal Count    No data filed       Delivery (Maternal)    No data filed                [facility]    Vaginal Delivery Note    Rachel Hernandez Patient Status:  Inpatient    1990 MRN [id number]   Location [facility] Attending Surya Pruitt MD   Hosp Day # 0 PCP Matias Ramos MD     Delivery     Infant  Date of Delivery: 2024    Time of Delivery: 3:10 PM   Delivery Type: Normal spontaneous vaginal delivery     Infant Sex/Birthweight: female No birth weight on file.     Presentation    Position          Apgars:  1 minute:                 5 minutes:                          10 minutes:      Placenta  Date/Time of Delivery:     Delivery: spontaneous  Placenta to Pathology: no  Cord Gases Submitted: no  Cord Blood Collection:  no  Cord Tissue Collection: no  Cord Complications: single nuchal  Sponge and Needle Counts:  Verified yes    Maternal Anesthesia: epidural   Episiotomy/Laceration Repair  Repair Comments: no laceration    Delivery Complications  none    Neonatologist Present: no  Delivery Comment:  female    Intake/Output   EBL:  100 ml      Surya Pruitt MD   2024  3:16 PM

## 2024-01-21 NOTE — H&P
[facility]    History & Physical    Rachel Hernandez Patient Status:  Outpatient    1990 MRN [id number]   Location [facility] BIRTH [facility] Attending Surya Pruitt MD   Hosp Day # 0 PCP Matias Ramos MD     Date of Admission:  2024      HPI:   Rachel Hernandez is a 33 year old  female, current EGA of 39w1d with an estimated date of delivery of: 2024, by Last Menstrual Period     She presents to L&D for contractions.    Being admitted for labor management.      Her current obstetrical history is significant for prior , history of post partum hemorrhage baby with apical VSD on fetal echo .      Patient Active Problem List   Diagnosis    Seizures (HCC)    Fibromyalgia    Chronic daily headache    Other acne    LILIAM (generalized anxiety disorder)    History of epilepsy    Abnormal TSH    COVID-19 vaccine series declined    Tachycardia       GBS negative. Rh positive.    History   Obstetric History:   OB History    Para Term  AB Living   4 2 2 0 1 2   SAB IAB Ectopic Multiple Live Births   1 0 0   2      # Outcome Date GA Lbr Ashok/2nd Weight Sex Delivery Anes PTL Lv   4 Current            3 Term 18 39w0d  8 lb 4 oz (3.742 kg)  NORMAL SPONT   DONNA      Complications: Hemorrhage   2 Term 09/24/15 39w0d    Caesarean   DONNA      Complications: Other - see comments   1 SAB               Obstetric Comments   PP hemorrhage       Gyne History: Cervical Papanicolaou contraindicated  ,    Past Medical History:   Past Medical History:   Diagnosis Date    Abnormal TSH 2021    Chronic daily headache 2021    COVID-19 vaccine series declined 2021    Fibromyalgia     LILIAM (generalized anxiety disorder) 2021    History of epilepsy 2021    Other acne 2021    SEIZURE DISORDER     Tachycardia 2021       Past Surgerical History:   Past Surgical History:   Procedure Laterality Date     DELIVERY ONLY          Social History:   Social History     Tobacco Use    Smoking status: Never    Smokeless tobacco: Never   Substance Use Topics    Alcohol use: Yes        Allergies/Medications:   Allergies:   No Known Allergies    Medications:  Medications Prior to Admission   Medication Sig Dispense Refill Last Dose    prenatal vitamin with DHA 27-0.8-228 MG Oral Cap Take 1 capsule by mouth daily.       ferrous sulfate 325 (65 FE) MG Oral Tab EC Take 1 tablet (325 mg total) by mouth daily with breakfast.       Lactic Ac-Citric Ac-Pot Bitart (PHEXXI) 1.8-1-0.4 % Vaginal Gel Place 1 each vaginally as needed. With intercourse. 30 each 2     Segesterone-Ethinyl Estradiol (ANNOVERA) 0.15-0.013 MG/24HR Vaginal Ring Place 1 ring vaginally As Directed. 1 each 0     SAVELLA 12.5 MG Oral Tab Take 12.5 mg by mouth daily.       Naltrexone HCl 50 MG Oral Tab Take 1 tablet (50 mg total) by mouth daily.  0     spironolactone 100 MG Oral Tab Take 100 mg by mouth nightly.       Magnesium 200 MG Oral Tab Take by mouth.       Segesterone-Ethinyl Estradiol (ANNOVERA) 0.15-0.013 MG/24HR Vaginal Ring Place vaginally.       clonazePAM 0.5 MG Oral Tab Take 0.25 mg by mouth.       OXcarbazepine 300 MG Oral Tab TAKE 1 TABLET BY MOUTH TWICE DAILY 60 Tab 0     Propranolol HCl 10 MG Oral Tab TAKE 1 TABLET BY MOUTH TWICE DAILY 60 Tab 0     TRINESSA, 28, 0.18/0.215/0.25 MG-35 MCG Oral Tab TAKE 1 TABLET BY MOUTH DAILY 84 Tab 0     OXcarbazepine (TRILEPTAL) 150 MG Oral Tab Take 150 mg by mouth 2 (two) times daily.       Multiple Vitamin (MULTIVITAMINS) Oral Tab Take 1 Tab by mouth daily.            Review of Systems:   As documented in HPI      Physical Exam:   Temp:  [98.4 °F (36.9 °C)] 98.4 °F (36.9 °C)  Resp:  [16] 16  BP: (129)/(87) 129/87    Constitutional: WNF    Lungs: CTA    Heart: RRR S1S2     Abdomen: gravid non tender FHT present     Ext: non tender    Vaginal exam: Dilation: 4 cm    Effacement: 75 %    Station: -2    Results:   No results found for  this or any previous visit (from the past 24 hour(s)).    No results found.        Assessment/Plan:   IUP 39w1d in labor    Obstetrical history significant for prior , history of post partum hemorrhage baby with apical VSD on fetal echo    Patient Active Problem List   Diagnosis    Seizures (HCC)    Fibromyalgia    Chronic daily headache    Other acne    LILIAM (generalized anxiety disorder)    History of epilepsy    Abnormal TSH    COVID-19 vaccine series declined    Tachycardia         Treatment Plan:  Admit  Epidural prn  Anticipate     Risks, benefits, alternatives and possible complications have been discussed in detail with the patient.   Pre-admission, admission, and post admission procedures and expectations were discussed in detail.    All questions answered; all appropriate consents will be signed at the Hospital.        Surya Pruitt MD  2024  7:24 AM

## 2024-01-21 NOTE — TRIAGE
[facility]      Triage Note    Rachel Hernandez Patient Status:  Outpatient    1990 MRN [id number]   Location [facility] BIRTH CENTER Attending Surya Pruitt MD   Hosp Day # 0 PCP Matias Ramos MD      Para:   Estimated Date of Delivery: 24  Gestation: 39w1d    Chief Complaint    R/o Labor; R/o Rom         Allergies:  No Known Allergies    Orders Placed This Encounter   Procedures    Antibody Identification (titer)    Rubella, IGG    ABO GROUPING    Hepatitis B Surface Antigen    Rapid HIV    T Pallidum Screening Lea    Rapid HIV    T Pallidum Screening Lea    POCT Ferning    Group B Strep PCR       Lab Results   Component Value Date    WBC 6.8 04/10/2012     04/10/2012    CREATSERUM 0.76 04/10/2012    BUN 11 04/10/2012     04/10/2012    K 3.6 04/10/2012     04/10/2012    CO2 26 04/10/2012    ALB 4.1 04/10/2012    ALKPHO 57 04/10/2012    TP 6.9 04/10/2012    AST 25 04/10/2012    ALT 16 04/10/2012    TSH 0.843 2021       Clinitek UA  Lab Results   Component Value Date    SPECGRAVITY 1.007 2021       UA  Lab Results   Component Value Date    SPECGRAVITY 1.007 2021    NITRITE Negative 2021       Vitals:    24 0447 24 0455   BP:  129/87   BP Location:  Left arm   Resp:  16   Temp:  98.4 °F (36.9 °C)   TempSrc:  Oral   Weight: 80.7 kg (178 lb)    Height: 170.2 cm (5' 7\")        NST  Variability: Moderate           Accelerations: Yes           Decelerations: None            Baseline: 130 BPM           Uterine Irritability: No           Contractions: Irregular                    Contraction Frequency: 7 minutes                               Nonstress Test Interpretation: Reactive           Nonstress Test Second Interpretation: Reactive           Discharged to home per ambulatory in stable condition with written and verbal instructions. Patient verbalizes understanding of information given.               Additional Comments       Chief Complaint   Patient presents with    R/o Labor     Pt states very painful contractions with possible SROM small leaking     R/o Rom     Pt states she noticed possible leaking while contractions intensify         Jackelyn GIRALDO RN  1/21/2024 7:23 AM

## 2024-01-21 NOTE — ANESTHESIA POSTPROCEDURE EVALUATION
Patient: Rachel Hernandez    Procedure Summary       Date: 01/21/24 Room / Location:     Anesthesia Start: 1120 Anesthesia Stop: 1512    Procedure: LABOR ANALGESIA Diagnosis:     Scheduled Providers:  Anesthesiologist: Colin Durant MD    Anesthesia Type: epidural ASA Status: 2 - Emergent            Anesthesia Type: epidural    Vitals Value Taken Time   /76 01/21/24 1531   Temp  01/21/24 1545   Pulse 88 01/21/24 1545   Resp  01/21/24 1545   SpO2 99 % 01/21/24 1545   Vitals shown include unfiled device data.    EM AN Post Evaluation:   Patient Evaluated in floor  Patient Participation: complete - patient participated  Level of Consciousness: awake and alert  Pain Score: 0  Pain Management: adequate  Airway Patency:patent  Yes    Cardiovascular Status: acceptable  Respiratory Status: acceptable  Postoperative Hydration acceptable      Colin Durant MD  1/21/2024 3:45 PM

## 2024-01-21 NOTE — PROGRESS NOTES
Pt is a 33 year old female admitted to TR1/TR1-A.     Chief Complaint   Patient presents with    R/o Labor     Pt states very painful contractions with possible SROM small leaking     R/o Rom     Pt states she noticed possible leaking while contractions intensify      Pt is  39w1d intra-uterine pregnancy.  History obtained, consents signed. Oriented to room, staff, and plan of care.

## 2024-01-22 VITALS
HEART RATE: 78 BPM | SYSTOLIC BLOOD PRESSURE: 125 MMHG | HEIGHT: 67 IN | WEIGHT: 178 LBS | BODY MASS INDEX: 27.94 KG/M2 | RESPIRATION RATE: 16 BRPM | OXYGEN SATURATION: 97 % | TEMPERATURE: 98 F | DIASTOLIC BLOOD PRESSURE: 77 MMHG

## 2024-01-22 LAB
BASOPHILS # BLD AUTO: 0.04 X10(3) UL (ref 0–0.2)
BASOPHILS NFR BLD AUTO: 0.4 %
DEPRECATED RDW RBC AUTO: 45.4 FL (ref 35.1–46.3)
EOSINOPHIL # BLD AUTO: 0.04 X10(3) UL (ref 0–0.7)
EOSINOPHIL NFR BLD AUTO: 0.4 %
ERYTHROCYTE [DISTWIDTH] IN BLOOD BY AUTOMATED COUNT: 13.7 % (ref 11–15)
HCT VFR BLD AUTO: 39.3 %
HGB BLD-MCNC: 13.5 G/DL
IMM GRANULOCYTES # BLD AUTO: 0.06 X10(3) UL (ref 0–1)
IMM GRANULOCYTES NFR BLD: 0.5 %
LYMPHOCYTES # BLD AUTO: 2.04 X10(3) UL (ref 1–4)
LYMPHOCYTES NFR BLD AUTO: 18.5 %
MCH RBC QN AUTO: 31 PG (ref 26–34)
MCHC RBC AUTO-ENTMCNC: 34.4 G/DL (ref 31–37)
MCV RBC AUTO: 90.1 FL
MONOCYTES # BLD AUTO: 0.62 X10(3) UL (ref 0.1–1)
MONOCYTES NFR BLD AUTO: 5.6 %
NEUTROPHILS # BLD AUTO: 8.25 X10 (3) UL (ref 1.5–7.7)
NEUTROPHILS # BLD AUTO: 8.25 X10(3) UL (ref 1.5–7.7)
NEUTROPHILS NFR BLD AUTO: 74.6 %
PLATELET # BLD AUTO: 204 10(3)UL (ref 150–450)
RBC # BLD AUTO: 4.36 X10(6)UL
WBC # BLD AUTO: 11.1 X10(3) UL (ref 4–11)

## 2024-01-22 PROCEDURE — 85025 COMPLETE CBC W/AUTO DIFF WBC: CPT | Performed by: OBSTETRICS & GYNECOLOGY

## 2024-01-22 RX ORDER — IBUPROFEN 600 MG/1
600 TABLET ORAL EVERY 6 HOURS PRN
Qty: 30 TABLET | Refills: 0 | Status: SHIPPED | OUTPATIENT
Start: 2024-01-22

## 2024-01-22 NOTE — PLAN OF CARE
Problem: POSTPARTUM  Goal: Long Term Goal:Experiences normal postpartum course  Description: INTERVENTIONS:  - Assess and monitor vital signs and lab values.  - Assess fundus and lochia.  - Provide ice/sitz baths for perineum discomfort.  - Monitor healing of incision/episiotomy/laceration, and assess for signs and symptoms of infection and hematoma.  - Assess bladder function and monitor for bladder distention.  - Provide/instruct/assist with pericare as needed.  - Provide VTE prophylaxis as needed.  - Monitor bowel function.  - Encourage ambulation and provide assistance as needed.  - Assess and monitor emotional status and provide social service/psych resources as needed.  - Utilize standard precautions and use personal protective equipment as indicated. Ensure aseptic care of all intravenous lines and invasive tubes/drains.  - Obtain immunization and exposure to communicable diseases history.  Outcome: Progressing  Goal: Optimize infant feeding at the breast  Description: INTERVENTIONS:  - Initiate breast feeding within first hour after birth.   - Monitor effectiveness of current breast feeding efforts.  - Assess support systems available to mother/family.  - Identify cultural beliefs/practices regarding lactation, letdown techniques, maternal food preferences.  - Assess mother's knowledge and previous experience with breast feeding.  - Provide information as needed about early infant feeding cues (e.g., rooting, lip smacking, sucking fingers/hand) versus late cue of crying.  - Discuss/demonstrate breast feeding aids (e.g., infant sling, nursing footstool/pillows, and breast pumps).  - Encourage mother/other family members to express feelings/concerns, and actively listen.  - Educate father/SO about benefits of breast feeding and how to manage common lactation challenges.  - Recommend avoidance of specific medications or substances incompatible with breast feeding.  - Assess and monitor for signs of nipple  pain/trauma.  - Instruct and provide assistance with proper latch.  - Review techniques for milk expression (breast pumping) and storage of breast milk. Provide pumping equipment/supplies, instructions and assistance, as needed.  - Encourage rooming-in and breast feeding on demand.  - Encourage skin-to-skin contact.  - Provide LC support as needed.  - Assess for and manage engorgement.  - Provide breast feeding education handouts and information on community breast feeding support.   Outcome: Progressing  Goal: Establishment of adequate milk supply with medication/procedure interruptions  Description: INTERVENTIONS:  - Review techniques for milk expression (breast pumping).   - Provide pumping equipment/supplies, instructions, and assistance until it is safe to breastfeed infant.  Outcome: Progressing  Problem: Patient Centered Care  Goal: Patient preferences are identified and integrated in the patient's plan of care  Description: Interventions:  - What would you like us to know as we care for you? Third baby, baby girl \"Eunice\". Breastfeeding well. Plan for discharge today pending infant testing.   - Provide timely, complete, and accurate information to patient/family  - Incorporate patient and family knowledge, values, beliefs, and cultural backgrounds into the planning and delivery of care  - Encourage patient/family to participate in care and decision-making at the level they choose  - Honor patient and family perspectives and choices  Outcome: Progressing     Goal: Appropriate maternal -  bonding  Description: INTERVENTIONS:  - Assess caregiver- interactions.  - Assess caregiver's emotional status and coping mechanisms.  - Encourage caregiver to participate in  daily care.  - Assess support systems available to mother/family.  - Provide /case management support as needed.  Outcome: Progressing

## 2024-01-22 NOTE — DISCHARGE SUMMARY
[facility]    Obstetrical Discharge Summary    Rachel Hernandez Patient Status:  Inpatient    1990 MRN [id number]   Location [facility] 3SE Attending Surya Pruitt MD   Hosp Day # 1 PCP Matias Ramos MD     Date of Admission: 2024     Date of Discharge: 2024    Admitting Diagnosis: yolanda   postdates repeat  Pregnancy  Vaginal birth after     Discharge Diagnosis: .Active Problems:    Pregnancy    Vaginal birth after       Disposition: Home    Hospital Course:   Reason for Admission:  Rachel Hernandez is a 33 year old  who was admitted with Estimated Date of Delivery: 24. She presented for labor management.  Pregnancy was complicated by:  Patient Active Problem List    Diagnosis Date Noted    Pregnancy 2024    Vaginal birth after  2024    Fibromyalgia 2021    Chronic daily headache 2021    Other acne 2021    LILIAM (generalized anxiety disorder) 2021    History of epilepsy 2021    Abnormal TSH 2021    COVID-19 vaccine series declined 2021    Tachycardia 2021    Seizures (HCC) 04/10/2012       Hospital Course: This is a 33 year old  who was admitted with Estimated Date of Delivery: 24.  Pt was admitted for labor precautions.    Pt underwent a spontaneous vaginal (). Refer to delivery note for details.  Pt was transferred to mother/ baby.  She underwent an uncomplicated postpartum course.  Pt will like to be discharged today.    Date of Delivery: 2024    Time of Delivery: 3:10 PM   Delivery Type: Normal spontaneous vaginal delivery     Live   Information for the patient's :  David, Girl [[id number]]   female  infant   Information for the patient's :  David, Girl [[id number]]   8 lb 15.2 oz (4.06 kg)    Apgars:  1 minute: 8                5 minutes: 9                         10 minutes:        Postpartum Course: Her postpartum  course was uncomplicated.  During pregnancy fetus was noted to have an apical VSD and was followed by DAVID and Dr. Mc.  Plan will be for  to have echo prior to discharge.      Discharge Physical Exam:   /73 (BP Location: Right arm)   Pulse 60   Temp 97.8 °F (36.6 °C) (Oral)   Resp 16   Ht 5' 7\" (1.702 m)   Wt 178 lb (80.7 kg)   LMP 2023   SpO2 97%   Breastfeeding Yes   BMI 27.88 kg/m²   General appearance:  alert, appears stated age, cooperative and no distress  Abdominal: soft,  no rebound; no guarding; appropriately tender  Uterus: firm, nontender, below umbilicus  Pelvic: deferred  Extremities: Homans sign is negative, no sign of DVT; no c/c/e      Results:   Recent Results (from the past 336 hour(s))   ABORH (Blood Type)    Collection Time: 24  7:33 AM   Result Value Ref Range    ABO BLOOD TYPE AB     RH BLOOD TYPE Positive    Antibody Screen    Collection Time: 24  7:33 AM   Result Value Ref Range    Antibody Screen Negative    CBC W/ DIFFERENTIAL    Collection Time: 24  7:33 AM   Result Value Ref Range    WBC 11.1 (H) 4.0 - 11.0 x10(3) uL    RBC 4.41 3.80 - 5.30 x10(6)uL    HGB 13.1 12.0 - 16.0 g/dL    HCT 39.3 35.0 - 48.0 %    MCV 89.1 80.0 - 100.0 fL    MCH 29.7 26.0 - 34.0 pg    MCHC 33.3 31.0 - 37.0 g/dL    RDW-SD 44.5 35.1 - 46.3 fL    RDW 13.6 11.0 - 15.0 %    .0 150.0 - 450.0 10(3)uL    Neutrophil Absolute Prelim 8.31 (H) 1.50 - 7.70 x10 (3) uL    Neutrophil Absolute 8.31 (H) 1.50 - 7.70 x10(3) uL    Lymphocyte Absolute 2.08 1.00 - 4.00 x10(3) uL    Monocyte Absolute 0.57 0.10 - 1.00 x10(3) uL    Eosinophil Absolute 0.04 0.00 - 0.70 x10(3) uL    Basophil Absolute 0.02 0.00 - 0.20 x10(3) uL    Immature Granulocyte Absolute 0.08 0.00 - 1.00 x10(3) uL    Neutrophil % 74.9 %    Lymphocyte % 18.7 %    Monocyte % 5.1 %    Eosinophil % 0.4 %    Basophil % 0.2 %    Immature Granulocyte % 0.7 %   ABORH Confirmation    Collection Time: 24  2:19 PM    Result Value Ref Range    ABO BLOOD TYPE AB     RH BLOOD TYPE Positive    CBC W/ DIFFERENTIAL    Collection Time: 24  5:38 AM   Result Value Ref Range    WBC 11.1 (H) 4.0 - 11.0 x10(3) uL    RBC 4.36 3.80 - 5.30 x10(6)uL    HGB 13.5 12.0 - 16.0 g/dL    HCT 39.3 35.0 - 48.0 %    MCV 90.1 80.0 - 100.0 fL    MCH 31.0 26.0 - 34.0 pg    MCHC 34.4 31.0 - 37.0 g/dL    RDW-SD 45.4 35.1 - 46.3 fL    RDW 13.7 11.0 - 15.0 %    .0 150.0 - 450.0 10(3)uL    Neutrophil Absolute Prelim 8.25 (H) 1.50 - 7.70 x10 (3) uL    Neutrophil Absolute 8.25 (H) 1.50 - 7.70 x10(3) uL    Lymphocyte Absolute 2.04 1.00 - 4.00 x10(3) uL    Monocyte Absolute 0.62 0.10 - 1.00 x10(3) uL    Eosinophil Absolute 0.04 0.00 - 0.70 x10(3) uL    Basophil Absolute 0.04 0.00 - 0.20 x10(3) uL    Immature Granulocyte Absolute 0.06 0.00 - 1.00 x10(3) uL    Neutrophil % 74.6 %    Lymphocyte % 18.5 %    Monocyte % 5.6 %    Eosinophil % 0.4 %    Basophil % 0.4 %    Immature Granulocyte % 0.5 %     No results for input(s): \"ABORH\" in the last 72 hours.  Recent Labs     24  0733 24  0538   WBC 11.1* 11.1*   HGB 13.1 13.5   HCT 39.3 39.3     No results found.    Complications: None    Surgeries:   Surgical Procedures       Case IDs Date Procedure Surgeon Location Status    [id number] 24  SECTION Kathy Ambrose MD [facility] L+D OR [facility]            Pending Labs:     Discharge Plan:   Discharge Condition: Good    Discharge Meds: Ibuprofen 600mg, 1 tab PO, q 6h4 PRN moderate pain.  Continue PNV daily            Discharge Diet: General diet    Discharge Activity:   No heavy lifting >25-30 lbs  Abstain from sexual intercourse until cleared at 6 wks PP  No driving the first week and when on opiates  Activity as tolerated.    No exercise until cleared at 6 wks PP    Follow up:      Follow-up Information       Surya Pruitt MD. Call in 6 week(s).    Specialty: OBSTETRICS & GYNECOLOGY  Why: Call the office to schedule a 6 weeks postpartum visit.   If you have any questions or concerns, call the office sooner.  Contact information:  [address]   [address]  [place] [id number] [phone]                               Graciela Brambila MD  1/22/2024

## 2024-01-22 NOTE — PROGRESS NOTES
[facility]    OB/Gyne Post  Progress Note      Rachel Hernandez Patient Status:  Inpatient    1990 MRN [id number]   Location [facility] 3SE Attending Surya Pruitt MD   Hosp Day # 1 PCP Matias Ramos MD       Subjective     Pt denies N/V/F/C/CP/SOB/palpitations, dizziness, headache, blurry vision, leg pain/calf pain.       Good pain control.  Reports cramping with breastfeeding.  Tolerating present diet.   Ambulating well. Voiding freely.  Breastfeeding: Yes   Vaginal bleeding: Decreasing     Objective   Vital signs in last 24 hours:  Temp:  [97.8 °F (36.6 °C)-98.3 °F (36.8 °C)] 97.8 °F (36.6 °C)  Pulse:  [] 60  Resp:  [16-18] 16  BP: (113-155)/(57-87) 117/73  SpO2:  [97 %-100 %] 97 %    Input/Output:    Intake/Output Summary (Last 24 hours) at 2024 0825  Last data filed at 2024 1839  Gross per 24 hour   Intake --   Output 950 ml   Net -950 ml         Constitutional: comfortable  Abdomen: soft, nontender, nondistended  Uterus: fundus firm and 1 cm below umbilicus,   Extremities: No calf tenderness; no c/c/e      Results:   Labs / Path / Radiology:    Recent Results (from the past 24 hour(s))   ABORH Confirmation    Collection Time: 24  2:19 PM   Result Value Ref Range    ABO BLOOD TYPE AB     RH BLOOD TYPE Positive    CBC W/ DIFFERENTIAL    Collection Time: 24  5:38 AM   Result Value Ref Range    WBC 11.1 (H) 4.0 - 11.0 x10(3) uL    RBC 4.36 3.80 - 5.30 x10(6)uL    HGB 13.5 12.0 - 16.0 g/dL    HCT 39.3 35.0 - 48.0 %    MCV 90.1 80.0 - 100.0 fL    MCH 31.0 26.0 - 34.0 pg    MCHC 34.4 31.0 - 37.0 g/dL    RDW-SD 45.4 35.1 - 46.3 fL    RDW 13.7 11.0 - 15.0 %    .0 150.0 - 450.0 10(3)uL    Neutrophil Absolute Prelim 8.25 (H) 1.50 - 7.70 x10 (3) uL    Neutrophil Absolute 8.25 (H) 1.50 - 7.70 x10(3) uL    Lymphocyte Absolute 2.04 1.00 - 4.00 x10(3) uL    Monocyte Absolute 0.62 0.10 - 1.00 x10(3) uL    Eosinophil Absolute 0.04 0.00 - 0.70  x10(3) uL    Basophil Absolute 0.04 0.00 - 0.20 x10(3) uL    Immature Granulocyte Absolute 0.06 0.00 - 1.00 x10(3) uL    Neutrophil % 74.6 %    Lymphocyte % 18.5 %    Monocyte % 5.6 %    Eosinophil % 0.4 %    Basophil % 0.4 %    Immature Granulocyte % 0.5 %       Specimens (From admission, onward)      None            No results found.      Assessment/Plan   33 year oldyo  , s/p spontaneous vaginal (), PPD# 1       Patient Active Problem List   Diagnosis    Seizures (HCC)    Fibromyalgia    Chronic daily headache    Other acne    LILIAM (generalized anxiety disorder)    History of epilepsy    Abnormal TSH    COVID-19 vaccine series declined    Tachycardia    Pregnancy    Vaginal birth after    .    Postpartum:  -Pt doing well  -Cramping tolerable and controlled  -Breastfeeding, lactation consultant available fo assistance  Hgb s/p delivery 13.5    2. H/o delayed PPH:  Currently no issues with her lochia    3.  Disposition:  Pt desires to go home today  discharge home with discharge instructions reviewed in detail  Rx for motrin given  Home instructions given and pt verbalized understanding  Pt to call the office and schedule an appt in 6 wks for PP visit  If any concerns or questions arise, pt to call the office and make an appt sooner for reevaluation.    During pregnancy fetus was noted to have an apical VSD and was followed by [facility] and Dr. Mc.  Plan will be for  to have echo prior to discharge.        Graciela Brambila MD  2024  8:25 AM

## 2024-01-22 NOTE — PLAN OF CARE
Discharge order received from MD.  Postpartum folder given, discharge medication form reviewed, and given to patient. ID Band matched with baby band. Patient informed when to make a follow-up appointment with OB. Mother is interacting appropriately with baby. Verbalized understanding of follow-up instructions. Discharged in stable condition, declined wheelchair.

## 2024-01-22 NOTE — PLAN OF CARE
Problem: POSTPARTUM  Goal: Long Term Goal:Experiences normal postpartum course  Description: INTERVENTIONS:  - Assess and monitor vital signs and lab values.  - Assess fundus and lochia.  - Provide ice/sitz baths for perineum discomfort.  - Monitor healing of incision/episiotomy/laceration, and assess for signs and symptoms of infection and hematoma.  - Assess bladder function and monitor for bladder distention.  - Provide/instruct/assist with pericare as needed.  - Provide VTE prophylaxis as needed.  - Monitor bowel function.  - Encourage ambulation and provide assistance as needed.  - Assess and monitor emotional status and provide social service/psych resources as needed.  - Utilize standard precautions and use personal protective equipment as indicated. Ensure aseptic care of all intravenous lines and invasive tubes/drains.  - Obtain immunization and exposure to communicable diseases history.  Outcome: Progressing  Goal: Optimize infant feeding at the breast  Description: INTERVENTIONS:  - Initiate breast feeding within first hour after birth.   - Monitor effectiveness of current breast feeding efforts.  - Assess support systems available to mother/family.  - Identify cultural beliefs/practices regarding lactation, letdown techniques, maternal food preferences.  - Assess mother's knowledge and previous experience with breast feeding.  - Provide information as needed about early infant feeding cues (e.g., rooting, lip smacking, sucking fingers/hand) versus late cue of crying.  - Discuss/demonstrate breast feeding aids (e.g., infant sling, nursing footstool/pillows, and breast pumps).  - Encourage mother/other family members to express feelings/concerns, and actively listen.  - Educate father/SO about benefits of breast feeding and how to manage common lactation challenges.  - Recommend avoidance of specific medications or substances incompatible with breast feeding.  - Assess and monitor for signs of nipple  pain/trauma.  - Instruct and provide assistance with proper latch.  - Review techniques for milk expression (breast pumping) and storage of breast milk. Provide pumping equipment/supplies, instructions and assistance, as needed.  - Encourage rooming-in and breast feeding on demand.  - Encourage skin-to-skin contact.  - Provide LC support as needed.  - Assess for and manage engorgement.  - Provide breast feeding education handouts and information on community breast feeding support.   Outcome: Progressing  Goal: Establishment of adequate milk supply with medication/procedure interruptions  Description: INTERVENTIONS:  - Review techniques for milk expression (breast pumping).   - Provide pumping equipment/supplies, instructions, and assistance until it is safe to breastfeed infant.  Outcome: Progressing  Goal: Experiences normal breast weaning course  Description: INTERVENTIONS:  - Assess for and manage engorgement.  - Instruct on breast care.  - Provide comfort measures.  Outcome: Progressing  Goal: Appropriate maternal -  bonding  Description: INTERVENTIONS:  - Assess caregiver- interactions.  - Assess caregiver's emotional status and coping mechanisms.  - Encourage caregiver to participate in  daily care.  - Assess support systems available to mother/family.  - Provide /case management support as needed.  Outcome: Progressing     Problem: PAIN - ADULT  Goal: Verbalizes/displays adequate comfort level or patient's stated pain goal  Description: INTERVENTIONS:  - Encourage pt to monitor pain and request assistance  - Assess pain using appropriate pain scale  - Administer analgesics based on type and severity of pain and evaluate response  - Implement non-pharmacological measures as appropriate and evaluate response  - Consider cultural and social influences on pain and pain management  - Manage/alleviate anxiety  - Utilize distraction and/or relaxation techniques  - Monitor for  opioid side effects  - Notify MD/LIP if interventions unsuccessful or patient reports new pain  - Anticipate increased pain with activity and pre-medicate as appropriate  Outcome: Progressing

## 2024-03-05 ENCOUNTER — TELEPHONE (OUTPATIENT)
Dept: OBGYN UNIT | Facility: HOSPITAL | Age: 34
End: 2024-03-05

## (undated) NOTE — LETTER
Dear New Mom,    We hope you are doing well. If, for any reason, you have questions or concerns about your health or your baby’s health, please contact your provider or your pediatrician or family medicine physician regarding your baby.     At [facility], we feel that postpartum support is very important for new families. Please see the enclosed new parent support flyer that lists support programs and resources with both in-person and online options.     Additionally, our Breastfeeding Centers at [facility] and [facility] in [place], offer outpatient visits with our International Board-Certified Lactation   Consultants (IBCLCs) for any breastfeeding concerns or questions you may have.    For issues related to stress, anxiety or depression, we have a Nurturing Mom support group that meets both in-person or online.  There’s also a 24-hour Mom’s Line where you can request a phone call from a clinical therapist for assistance for postpartum depression.    We encourage you to take advantage of these programs and resources as you recover from childbirth and learn to care for your new infant.    Best wishes,    [facility]            [id number]

## (undated) NOTE — LETTER
[facility]  Administration of Anesthesia  I, Rachel Hernandez agree to be cared for by a physician anesthesiologist alone and/or with a nurse anesthetist, who is specially trained to monitor me and give me medicine to put me to sleep or keep me comfortable during my procedure    I understand that my anesthesiologist and/or anesthetist is not an employee or agent of [facility] or [employer]. He or she works for [facility]    As the patient asking for anesthesia services, I agree to:  Allow the anesthesiologist (anesthesia doctor) to give me medicine and do additional procedures as necessary. Some examples are: Starting or using an “IV” to give me medicine, fluids or blood during my procedure, and having a breathing tube placed to help me breathe when I’m asleep (intubation). In the event that my heart stops working properly, I understand that my anesthesiologist will make every effort to sustain my life, unless otherwise directed by [facility] Do Not Resuscitate documents.  Tell my anesthesia doctor before my procedure:  If I am pregnant.  The last time that I ate or drank.  iii. All of the medicines I take (including prescriptions, herbal supplements, and pills I can buy without a prescription (including street drugs/illegal medications). Failure to inform my anesthesiologist about these medicines may increase my risk of anesthetic complications.  iv.If I am allergic to anything or have had a reaction to anesthesia before.  I understand how the anesthesia medicine will help me (benefits).  I understand that with any type of anesthesia medicine there are risks:  The most common risks are: nausea, vomiting, sore throat, muscle soreness, damage to my eyes, mouth, or teeth (from breathing tube placement).  Rare risks include: remembering what happened during my procedure, allergic reactions to medications, injury to my airway, heart, lungs, vision, nerves,  or muscles and in extremely rare instances death.  My doctor has explained to me other choices available to me for my care (alternatives).  Pregnant Patients (“epidural”):  I understand that the risks of having an epidural (medicine given into my back to help control pain during labor), include itching, low blood pressure, difficulty urinating, headache or slowing of the baby’s heart. Very rare risks include infection, bleeding, seizure, irregular heart rhythms and nerve injury.  Regional Anesthesia (“spinal”, “epidural”, & “nerve blocks”):  I understand that rare but potential complications include headache, bleeding, infection, seizure, irregular heart rhythms, and nerve injury.    _____________________________________________________________________________  Patient (or Representative) Signature/Relationship to Patient  Date   Time    _____________________________________________________________________________   Name (if used)    Language/Organization   Time    _____________________________________________________________________________  Nurse Anesthetist Signature     Date   Time  _____________________________________________________________________________  Anesthesiologist Signature     Date   Time  I have discussed the procedure and information above with the patient (or patient’s representative) and answered their questions. The patient or their representative has agreed to have anesthesia services.    _____________________________________________________________________________  Witness        Date   Time  I have verified that the signature is that of the patient or patient’s representative, and that it was signed before the procedure  Patient Name: Rachel Hernandez     : 1990                 Printed: 2024 at 7:32 AM    Medical Record #: [id number]                                            Page 1 of 1  ----------ANESTHESIA CONSENT----------